# Patient Record
Sex: FEMALE | Race: WHITE | NOT HISPANIC OR LATINO
[De-identification: names, ages, dates, MRNs, and addresses within clinical notes are randomized per-mention and may not be internally consistent; named-entity substitution may affect disease eponyms.]

---

## 2021-01-22 ENCOUNTER — RESULT REVIEW (OUTPATIENT)
Age: 46
End: 2021-01-22

## 2022-01-31 ENCOUNTER — RESULT REVIEW (OUTPATIENT)
Age: 47
End: 2022-01-31

## 2022-07-21 ENCOUNTER — NON-APPOINTMENT (OUTPATIENT)
Age: 47
End: 2022-07-21

## 2023-11-13 ENCOUNTER — NON-APPOINTMENT (OUTPATIENT)
Age: 48
End: 2023-11-13

## 2023-12-06 ENCOUNTER — TRANSCRIPTION ENCOUNTER (OUTPATIENT)
Age: 48
End: 2023-12-06

## 2023-12-06 ENCOUNTER — APPOINTMENT (OUTPATIENT)
Dept: BREAST CENTER | Facility: CLINIC | Age: 48
End: 2023-12-06
Payer: COMMERCIAL

## 2023-12-06 VITALS
WEIGHT: 192 LBS | BODY MASS INDEX: 34.02 KG/M2 | SYSTOLIC BLOOD PRESSURE: 175 MMHG | DIASTOLIC BLOOD PRESSURE: 106 MMHG | HEIGHT: 63 IN | HEART RATE: 72 BPM

## 2023-12-06 DIAGNOSIS — Z00.00 ENCOUNTER FOR GENERAL ADULT MEDICAL EXAMINATION W/OUT ABNORMAL FINDINGS: ICD-10-CM

## 2023-12-06 PROCEDURE — 99204 OFFICE O/P NEW MOD 45 MIN: CPT

## 2023-12-06 RX ORDER — LISINOPRIL 30 MG/1
TABLET ORAL
Refills: 0 | Status: ACTIVE | COMMUNITY

## 2024-06-05 ENCOUNTER — APPOINTMENT (OUTPATIENT)
Dept: MRI IMAGING | Facility: CLINIC | Age: 49
End: 2024-06-05
Payer: COMMERCIAL

## 2024-06-05 PROCEDURE — A9585: CPT | Mod: JW

## 2024-06-05 PROCEDURE — 77049 MRI BREAST C-+ W/CAD BI: CPT

## 2024-06-17 PROBLEM — Z91.89 AT HIGH RISK FOR BREAST CANCER: Status: ACTIVE | Noted: 2023-12-06

## 2024-06-17 PROBLEM — Z80.3 FAMILY HISTORY OF BREAST CANCER: Status: ACTIVE | Noted: 2023-12-06

## 2024-06-17 PROBLEM — Z15.01 MONOALLELIC MUTATION OF CHEK2 GENE IN FEMALE PATIENT: Status: ACTIVE | Noted: 2023-12-06

## 2024-06-19 ENCOUNTER — APPOINTMENT (OUTPATIENT)
Dept: BREAST CENTER | Facility: CLINIC | Age: 49
End: 2024-06-19
Payer: COMMERCIAL

## 2024-06-19 VITALS
SYSTOLIC BLOOD PRESSURE: 142 MMHG | HEART RATE: 129 BPM | WEIGHT: 189 LBS | HEIGHT: 63 IN | DIASTOLIC BLOOD PRESSURE: 97 MMHG | BODY MASS INDEX: 33.49 KG/M2

## 2024-06-19 DIAGNOSIS — R92.8 OTHER ABNORMAL AND INCONCLUSIVE FINDINGS ON DIAGNOSTIC IMAGING OF BREAST: ICD-10-CM

## 2024-06-19 DIAGNOSIS — Z15.09 GENETIC SUSCEPTIBILITY TO MALIGNANT NEOPLASM OF BREAST: ICD-10-CM

## 2024-06-19 DIAGNOSIS — Z15.89 GENETIC SUSCEPTIBILITY TO MALIGNANT NEOPLASM OF BREAST: ICD-10-CM

## 2024-06-19 DIAGNOSIS — Z80.3 FAMILY HISTORY OF MALIGNANT NEOPLASM OF BREAST: ICD-10-CM

## 2024-06-19 DIAGNOSIS — Z15.01 GENETIC SUSCEPTIBILITY TO MALIGNANT NEOPLASM OF BREAST: ICD-10-CM

## 2024-06-19 DIAGNOSIS — Z91.89 OTHER SPECIFIED PERSONAL RISK FACTORS, NOT ELSEWHERE CLASSIFIED: ICD-10-CM

## 2024-06-19 DIAGNOSIS — Z15.02 GENETIC SUSCEPTIBILITY TO MALIGNANT NEOPLASM OF BREAST: ICD-10-CM

## 2024-06-19 PROCEDURE — 99213 OFFICE O/P EST LOW 20 MIN: CPT

## 2024-06-24 NOTE — DATA REVIEWED
[FreeTextEntry1] : 7/15/21 B/L sxMMG ([facility]): scattered areas of fbg density. An asymmetry in the inferior right breast at middle depth on MLO view. No significant masses, calcifications, or other findings in either breast. BIRADS- 0. FOLLOW UP: additional imaging for the asymmetry in the right breast, requires spot compression views and targeted US.  7/23/21 Targeted R US: scattered areas of fbg density. The asymmetry in inferior R breast appears much less prominent on spot compression views, compatible with overlapping benign fbg tissue. BIRADS- 1.  Follow Up: Annual mammogram, due July 2022 11/27/23 B/L sMMG/US: scattered areas of fbg density. No imaging evidence of malignancy on the current exam. RECOMMENDATION: 1 year screening mammogram. BIRADS-1: Negative   6/5/24 ([facility]) B/L MRI: scattered areas of fibroglandular density. B/L areas of patchy enhancement, favored to be benign, however recommends follow up MRI with pre and postcontrast in 6 months. BI-RADS 3, probably benign. Follow Up: 6-month follow up MRI. Additionally, patient is due for screening mammo in Nov 2024.

## 2024-06-24 NOTE — HISTORY OF PRESENT ILLNESS
[FreeTextEntry1] : 49 year old CHEK2+ female, patient of Dr. Yesenia Haas, presents for follow up evaluation regarding high-risk screening with fmhx of breast cancer in [place] age 70 and [place] age 40s. Patient's B/L sMMG completed 7/15/21 was BIRADS-0 showing asymmetry in the inferior R breast only on MLO views, with a targeted R US on 21 suggesting the asymmetry to be benign fibroglandular tissue, BIRADS-1. Denies prior breast surgeries or biopsies.  B/L sMMG on 23 BIRADS-1. Most recent imagin24 B/L MRI, BI-RADS 3, yielding areas of patchy enhancement bilaterally, favored to be benign, however recommends follow up MRI with pre and postcontrast in 6 months, and additionally noted patient is due for screening mammo in 2024. Patient denies palpable masses, nipple discharge, skin changes.  Patient reports family history of breast cancer in [place] age 70 and [place] age 40s. Denies famhx of ovarian cancer. Famhx of colon cancer in [place] age 62, uterine cancer in sister age 44 and colon cancer in father age 69. Patient had [facility] genetic testing performed in Aug 2023, prompted as new diagnosis of various cancers in the family, as noted.   [place] Lifetime Risk 13.8% (does not consider CHEK2+ gene mutation)

## 2024-06-24 NOTE — ASSESSMENT
[FreeTextEntry1] : 49 year old CHEK2+ female presents for follow up evaluation for high risk screening. Patient's B/L sMMG completed 7/15/21 was BIRADS-0 showing asymmetry in the inferior R breast only on MLO views, with a targeted R US on 7/23/21 stating the asymmetry is benign fibroglandular tissue, BIRADS-1. Patient did not have imaging in 2022.   [facility] genetic testing performed in Aug 2023, prompted as new diagnosis of various cancers in the family, as noted.  [place] Lifetime Risk 13.8% (does not consider CHEK2+ gene mutation). Reviewed patients high risk status, given her mutation, which is considered to be at high risk to develop breast cancer over the span of her lifetime and reviewed her current high risk screening surveillance that includes biannual radiological screening exams with a mammogram and screening MRI.  B/L sMMG on 11/27/23 BIRADS-1. Most recent imaging reviewed in depth; 6/5/24 B/L MRI, BI-RADS 3, yielding areas of patchy enhancement bilaterally, favored to be benign, however recommends follow up MRI with pre and postcontrast in 6 months. Advised patient she is due for screening mammo in Nov 2024 as well and discussed the possibility of adding on the 6 month follow up MRI at the time of her upcoming annual screening imaging.   Patient without complaint. Physical exam WNL. Plan for B/L sMMG/US and B/L MRI, as well as re-examination in Nov 2024. Advised patient that, if an insurance issue occurs, she may need to have the MRI in December and if so, will move back her office visit to follow her MRI. Patient verbalizes understanding and is in agreement with the plan.

## 2024-06-24 NOTE — FAMILY HISTORY
[TextEntry] : Father colon cancer age 69 Sister uterine cancer age 44 Paternal aunt breast cancer age 70 Maternal aunt breast cancer age 40's, colon cancer age 62

## 2024-06-24 NOTE — PAST MEDICAL HISTORY
[Menstruating] : The patient is menstruating [Menarche Age ____] : age at menarche was [unfilled] [Approximately ___] : the LMP was approximately [unfilled] [Total Preg ___] : G[unfilled] [Live Births ___] : P[unfilled]  [Age At Live Birth ___] : Age at live birth: [unfilled] [History of Hormone Replacement Treatment] : has no history of hormone replacement treatment [FreeTextEntry6] : No [FreeTextEntry7] : Yes IUD  [FreeTextEntry8] : Yes

## 2024-11-13 ENCOUNTER — APPOINTMENT (OUTPATIENT)
Dept: BREAST CENTER | Facility: CLINIC | Age: 49
End: 2024-11-13